# Patient Record
Sex: MALE
[De-identification: names, ages, dates, MRNs, and addresses within clinical notes are randomized per-mention and may not be internally consistent; named-entity substitution may affect disease eponyms.]

---

## 2024-01-23 ENCOUNTER — DASHBOARD ENCOUNTERS (OUTPATIENT)
Age: 54
End: 2024-01-23

## 2024-01-23 ENCOUNTER — OFFICE VISIT (OUTPATIENT)
Dept: URBAN - METROPOLITAN AREA CLINIC 92 | Facility: CLINIC | Age: 54
End: 2024-01-23
Payer: COMMERCIAL

## 2024-01-23 VITALS
HEART RATE: 72 BPM | TEMPERATURE: 97.2 F | DIASTOLIC BLOOD PRESSURE: 85 MMHG | WEIGHT: 182.4 LBS | SYSTOLIC BLOOD PRESSURE: 114 MMHG | BODY MASS INDEX: 26.11 KG/M2 | HEIGHT: 70 IN

## 2024-01-23 DIAGNOSIS — R13.10 ODYNOPHAGIA: ICD-10-CM

## 2024-01-23 DIAGNOSIS — R07.89 CHEST DISCOMFORT: ICD-10-CM

## 2024-01-23 PROBLEM — 30233002: Status: ACTIVE | Noted: 2024-01-23

## 2024-01-23 PROCEDURE — 99243 OFF/OP CNSLTJ NEW/EST LOW 30: CPT

## 2024-01-23 PROCEDURE — 99203 OFFICE O/P NEW LOW 30 MIN: CPT

## 2024-01-23 RX ORDER — ALLOPURINOL 100 MG/1
TABLET ORAL
Qty: 30 EACH | Refills: 1 | Status: ACTIVE | COMMUNITY

## 2024-01-23 RX ORDER — COLCHICINE 0.6 MG/1
TABLET, FILM COATED ORAL
Qty: 10 TABLET | Status: ACTIVE | COMMUNITY

## 2024-01-23 RX ORDER — DICYCLOMINE HYDROCHLORIDE 10 MG/1
TAKE ONE CAPSULE BY MOUTH EVERY 6 HOURS AS NEEDED FOR ABDOMINAL SPASMS FOR UP TO 30 DAYS CAPSULE ORAL
Qty: 120 EACH | Refills: 0 | Status: DISCONTINUED | COMMUNITY

## 2024-01-23 RX ORDER — LORATADINE 10 MG/1
TAKE 1 TABLET BY MOUTH EVERY DAY TABLET ORAL
Qty: 14 EACH | Refills: 0 | Status: ACTIVE | COMMUNITY

## 2024-01-23 RX ORDER — OMEPRAZOLE 20 MG/1
CAPSULE, DELAYED RELEASE ORAL
Qty: 30 CAPSULE | Status: ACTIVE | COMMUNITY

## 2024-01-23 RX ORDER — FAMOTIDINE 20 MG/1
TAKE 1 TABLET BY MOUTH TWICE DAILY FOR 30 DAYS TABLET, FILM COATED ORAL
Qty: 60 EACH | Refills: 0 | Status: DISCONTINUED | COMMUNITY

## 2024-01-23 RX ORDER — ONDANSETRON 4 MG/1
TABLET, ORALLY DISINTEGRATING ORAL
Qty: 15 TABLET | Status: DISCONTINUED | COMMUNITY

## 2024-01-23 NOTE — HPI-TODAY'S VISIT:
This patient was referred by Dr. Wendy Jimenez for an evaluation of GERD.  A copy of this will be sent to the referring provider. Patient was seen in ED on 12/27/23 for abdominal pain, N/V, s/p binge drinking for one week. CT abd and EKG were unremarkable. Per PCP note, patient reported he was having burning chest pain with eating associated with swallowing food.  Today, patient reports over [place] he developed chest pain and vomiting leading to painful swallowing. Now he is eating well but notes of a weird chest sensation. Patient is c/w omeprazole 20mg QD. Denies current dysphagia, reflux, N/V. Denies abdominal pain, constipation, diarrhea, hematochezia, or melena. Denies NSAID Use. Patient decreased alcohol intake to 5 drinks weekly. Denies tobacco or illicit drug use. Denies fmhx of GI cancers.   Patient had a colonoscopy last week at [place], normal per patient, 10 yr repeat.   Denies cardiac, lung or kidney issues. No pacemaker/defibrillator. No blood thinner. No home O2. No dialysis.

## 2024-02-15 ENCOUNTER — EGD (OUTPATIENT)
Dept: URBAN - METROPOLITAN AREA SURGERY CENTER 16 | Facility: SURGERY CENTER | Age: 54
End: 2024-02-15
Payer: COMMERCIAL

## 2024-02-15 ENCOUNTER — LAB (OUTPATIENT)
Dept: URBAN - METROPOLITAN AREA CLINIC 92 | Facility: CLINIC | Age: 54
End: 2024-02-15

## 2024-02-15 DIAGNOSIS — K20.80 OTHER ESOPHAGITIS: ICD-10-CM

## 2024-02-15 DIAGNOSIS — R13.19 CERVICAL DYSPHAGIA: ICD-10-CM

## 2024-02-15 PROCEDURE — 43239 EGD BIOPSY SINGLE/MULTIPLE: CPT | Performed by: INTERNAL MEDICINE

## 2024-02-15 RX ORDER — LORATADINE 10 MG/1
TAKE 1 TABLET BY MOUTH EVERY DAY TABLET ORAL
Qty: 14 EACH | Refills: 0 | Status: ACTIVE | COMMUNITY

## 2024-02-15 RX ORDER — ALLOPURINOL 100 MG/1
TABLET ORAL
Qty: 30 EACH | Refills: 1 | Status: ACTIVE | COMMUNITY

## 2024-02-15 RX ORDER — OMEPRAZOLE 20 MG/1
CAPSULE, DELAYED RELEASE ORAL
Qty: 30 CAPSULE | Status: ACTIVE | COMMUNITY

## 2024-02-15 RX ORDER — COLCHICINE 0.6 MG/1
TABLET, FILM COATED ORAL
Qty: 10 TABLET | Status: ACTIVE | COMMUNITY

## 2024-03-07 ENCOUNTER — OV EP (OUTPATIENT)
Dept: URBAN - METROPOLITAN AREA CLINIC 92 | Facility: CLINIC | Age: 54
End: 2024-03-07

## 2024-05-09 ENCOUNTER — OFFICE VISIT (OUTPATIENT)
Dept: URBAN - METROPOLITAN AREA SURGERY CENTER 16 | Facility: SURGERY CENTER | Age: 54
End: 2024-05-09